# Patient Record
Sex: FEMALE | Race: WHITE | URBAN - METROPOLITAN AREA
[De-identification: names, ages, dates, MRNs, and addresses within clinical notes are randomized per-mention and may not be internally consistent; named-entity substitution may affect disease eponyms.]

---

## 2018-04-27 ENCOUNTER — TRANSFERRED RECORDS (OUTPATIENT)
Dept: HEALTH INFORMATION MANAGEMENT | Facility: CLINIC | Age: 83
End: 2018-04-27

## 2018-04-27 DIAGNOSIS — E11.9 DIABETES MELLITUS (H): ICD-10-CM

## 2018-04-27 DIAGNOSIS — H35.3131 EARLY STAGE NONEXUDATIVE AGE-RELATED MACULAR DEGENERATION OF BOTH EYES: ICD-10-CM

## 2018-04-27 DIAGNOSIS — Z96.1 PSEUDOPHAKIA: ICD-10-CM

## 2018-04-27 DIAGNOSIS — I10 BENIGN ESSENTIAL HYPERTENSION: Primary | ICD-10-CM

## 2018-04-27 DIAGNOSIS — H18.519 ENDOTHELIAL CORNEAL DYSTROPHY: ICD-10-CM

## 2018-04-27 DIAGNOSIS — H34.11 CENTRAL RETINAL ARTERY OCCLUSION OF RIGHT EYE: Primary | ICD-10-CM

## 2018-04-27 DIAGNOSIS — H43.813 POSTERIOR VITREOUS DETACHMENT OF BOTH EYES: ICD-10-CM

## 2018-04-27 LAB
CRP SERPL-MCNC: 5.8 MG/L (ref 0–8)
ERYTHROCYTE [DISTWIDTH] IN BLOOD BY AUTOMATED COUNT: 14 % (ref 10–15)
ERYTHROCYTE [SEDIMENTATION RATE] IN BLOOD BY WESTERGREN METHOD: 7 MM/H (ref 0–30)
FIBRINOGEN PPP-MCNC: 341 MG/DL (ref 200–420)
HCT VFR BLD AUTO: 46.1 % (ref 35–47)
HGB BLD-MCNC: 15.1 G/DL (ref 11.7–15.7)
MCH RBC QN AUTO: 32.4 PG (ref 26.5–33)
MCHC RBC AUTO-ENTMCNC: 32.8 G/DL (ref 31.5–36.5)
MCV RBC AUTO: 99 FL (ref 78–100)
PLATELET # BLD AUTO: 241 10E9/L (ref 150–450)
RBC # BLD AUTO: 4.66 10E12/L (ref 3.8–5.2)
WBC # BLD AUTO: 9.6 10E9/L (ref 4–11)

## 2018-04-27 PROCEDURE — 85027 COMPLETE CBC AUTOMATED: CPT | Performed by: OPHTHALMOLOGY

## 2018-04-27 PROCEDURE — 86140 C-REACTIVE PROTEIN: CPT | Performed by: OPHTHALMOLOGY

## 2018-04-27 PROCEDURE — 36415 COLL VENOUS BLD VENIPUNCTURE: CPT | Performed by: OPHTHALMOLOGY

## 2018-04-27 PROCEDURE — 85652 RBC SED RATE AUTOMATED: CPT | Performed by: OPHTHALMOLOGY

## 2018-04-27 PROCEDURE — 85384 FIBRINOGEN ACTIVITY: CPT | Performed by: OPHTHALMOLOGY

## 2018-04-30 ENCOUNTER — HOSPITAL ENCOUNTER (OUTPATIENT)
Dept: CARDIOLOGY | Facility: CLINIC | Age: 83
Discharge: HOME | End: 2018-04-30
Payer: MEDICARE

## 2018-04-30 ENCOUNTER — HOSPITAL ENCOUNTER (EMERGENCY)
Facility: CLINIC | Age: 83
Discharge: HOME | End: 2018-04-30
Attending: FAMILY MEDICINE | Admitting: FAMILY MEDICINE
Payer: MEDICARE

## 2018-04-30 ENCOUNTER — APPOINTMENT (OUTPATIENT)
Dept: MRI IMAGING | Facility: CLINIC | Age: 83
End: 2018-04-30
Attending: FAMILY MEDICINE
Payer: MEDICARE

## 2018-04-30 ENCOUNTER — HOSPITAL ENCOUNTER (OUTPATIENT)
Dept: ULTRASOUND IMAGING | Facility: CLINIC | Age: 83
End: 2018-04-30
Attending: OPHTHALMOLOGY
Payer: MEDICARE

## 2018-04-30 VITALS
TEMPERATURE: 96.8 F | DIASTOLIC BLOOD PRESSURE: 76 MMHG | OXYGEN SATURATION: 98 % | SYSTOLIC BLOOD PRESSURE: 183 MMHG | BODY MASS INDEX: 24.36 KG/M2 | WEIGHT: 141.9 LBS

## 2018-04-30 DIAGNOSIS — M31.6 TEMPORAL ARTERITIS (H): ICD-10-CM

## 2018-04-30 DIAGNOSIS — I65.22 LEFT CAROTID ARTERY STENOSIS: ICD-10-CM

## 2018-04-30 DIAGNOSIS — I10 BENIGN ESSENTIAL HYPERTENSION: ICD-10-CM

## 2018-04-30 LAB
CREAT BLD-MCNC: 0.8 MG/DL (ref 0.52–1.04)
GFR SERPL CREATININE-BSD FRML MDRD: 69 ML/MIN/1.7M2

## 2018-04-30 PROCEDURE — 99285 EMERGENCY DEPT VISIT HI MDM: CPT | Mod: 25 | Performed by: FAMILY MEDICINE

## 2018-04-30 PROCEDURE — 93306 TTE W/DOPPLER COMPLETE: CPT | Mod: 26 | Performed by: INTERNAL MEDICINE

## 2018-04-30 PROCEDURE — 70549 MR ANGIOGRAPH NECK W/O&W/DYE: CPT

## 2018-04-30 PROCEDURE — 25000125 ZZHC RX 250: Performed by: FAMILY MEDICINE

## 2018-04-30 PROCEDURE — 70544 MR ANGIOGRAPHY HEAD W/O DYE: CPT

## 2018-04-30 PROCEDURE — 99285 EMERGENCY DEPT VISIT HI MDM: CPT | Mod: Z6 | Performed by: FAMILY MEDICINE

## 2018-04-30 PROCEDURE — A9585 GADOBUTROL INJECTION: HCPCS | Performed by: FAMILY MEDICINE

## 2018-04-30 PROCEDURE — 25000128 H RX IP 250 OP 636: Performed by: FAMILY MEDICINE

## 2018-04-30 PROCEDURE — 82565 ASSAY OF CREATININE: CPT

## 2018-04-30 PROCEDURE — 93880 EXTRACRANIAL BILAT STUDY: CPT

## 2018-04-30 PROCEDURE — 93306 TTE W/DOPPLER COMPLETE: CPT

## 2018-04-30 RX ORDER — LEVOTHYROXINE SODIUM 112 UG/1
112 TABLET ORAL DAILY
COMMUNITY
Start: 2018-04-29

## 2018-04-30 RX ORDER — GADOBUTROL 604.72 MG/ML
7.5 INJECTION INTRAVENOUS ONCE
Status: COMPLETED | OUTPATIENT
Start: 2018-04-30 | End: 2018-04-30

## 2018-04-30 RX ADMIN — GADOBUTROL 7.5 ML: 604.72 INJECTION INTRAVENOUS at 16:17

## 2018-04-30 RX ADMIN — SODIUM CHLORIDE 50 ML: 9 INJECTION, SOLUTION INTRAVENOUS at 16:17

## 2018-04-30 ASSESSMENT — ENCOUNTER SYMPTOMS
ACTIVITY CHANGE: 0
WEAKNESS: 0

## 2018-04-30 NOTE — ED AVS SNAPSHOT
[facility] Emergency Department    [address] [place] [address]    Phone:  [phone]    Fax:  [phone]                                       Bertha Ornelas   MRN: [id number]    Department:  [facility] Emergency Department   Date of Visit:  4/30/2018           Patient Information     Date Of Birth          1935        Your diagnoses for this visit were:     Left carotid artery stenosis        You were seen by Tracey Land MD.      Follow-up Information     Schedule an appointment as soon as possible for a visit with Marco A Lara MD.    Specialty:  Family Practice    Why:  as soon as possible    Contact information:    [facility]  [address] [place]  [place] [address]  [phone]          Follow up with [facility] Emergency Department.    Specialty:  EMERGENCY MEDICINE    Why:  If symptoms worsen    Contact information:    [address] [place] [address] [address] [place] [address] [phone]    Additional information:    From [address]: Exit at [employer] on south side of [place]. Turn right on [address]. Turn left at stoplight on [address]. [facility] will be in view two blocks ahead        Discharge Instructions       Thank you for giving us the opportunity to see you.  We completed an MRI scan of your brain and carotid arteries.  There is an isolated severe stenosis of the left carotid bulb.    Please follow-up with your primary care provider as soon as possible.  You should see a vascular surgeon within the next several weeks.    Begin aspirin daily.    After discharge, please closely monitor for any new or worsening symptoms. Return to the Emergency Department at any time if your symptoms worsen.        Carotid Artery Problems: Blockage     A healthy carotid artery lets blood flow easily to the brain.   The blood carries oxygen and nutrients throughout the body. The carotid arteries are large blood vessels that  carry blood to the brain. Certain health problems can make the inside of the carotid arteries narrow and rough. Over time, this damage increases the chances of having a stroke. A stroke is a sudden loss of brain function.   Open carotid arteries  In a healthy carotid artery, the inside of the artery is open. The lining of the artery wall is also smooth. This lets blood flow freely from the heart to the brain. The brain gets all the oxygen and nutrients it needs to function well.  Narrowed carotid arteries  Health factors, such as high blood pressure, high cholesterol, smoking, and diabetes, can damage artery walls and make them rough. This allows cholesterol and other particles in the blood to stick to the artery walls and form plaque or fatty deposits. As the plaque builds up, it can narrow the artery. Blood may also collect on the plaque and form blood clots.  How a stroke happens     Emboli can enter the bloodstream and travel to the brain. Brain tissue is damaged when emboli block arteries in the brain.   A stroke can happen when plaque in the carotid artery ruptures. This can allow small pieces of plaque to break off into the bloodstream. At the same time, rupture can make more blood clots. The pieces of plaque and tiny blood clots or emboli can flow in the blood until they get stuck in a small blood vessel in the brain. This blocks blood flow to part of the brain and causes a stroke.  Date Last Reviewed: 6/8/2015 2000-2017 [employer]. [address] [place] [address]. All rights reserved. This information is not intended as a substitute for professional medical care. Always follow your healthcare professional's instructions.          24 Hour Appointment Hotline       To make an appointment at any [facility], call [phone] ([phone]). If you don't have a family doctor or clinic, we will help you find one. [facility] are conveniently located to serve the needs of  "you and your family.             Review of your medicines      Our records show that you are taking the medicines listed below. If these are incorrect, please call your family doctor or clinic.        Dose / Directions Last dose taken    levothyroxine 112 MCG tablet   Commonly known as:  SYNTHROID/LEVOTHROID   Dose:  112 mcg        Take 112 mcg by mouth daily   Refills:  0                Procedures and tests performed during your visit     Creatinine POCT    Head MRA w/o contrast - STROKE PROTOCOL    Neck MRA w & w/o contrast - STROKE PROTOCOL      Orders Needing Specimen Collection     None      Pending Results     Date and Time Order Name Status Description    4/30/2018 1227 Head MRA w/o contrast - STROKE PROTOCOL Preliminary     4/30/2018 1227 Neck MRA w & w/o contrast - STROKE PROTOCOL Preliminary             Pending Culture Results     No orders found from 4/28/2018 to 5/1/2018.            Pending Results Instructions     If you had any lab results that were not finalized at the time of your Discharge, you can call the ED Lab Result RN at [phone]. You will be contacted by this team for any positive Lab results or changes in treatment. The nurses are available 7 days a week from 10A to 6:30P.  You can leave a message 24 hours per day and they will return your call.        Thank you for choosing [facility]       Thank you for choosing [facility] for your care. Our goal is always to provide you with excellent care. Hearing back from our patients is one way we can continue to improve our services. Please take a few minutes to complete the written survey that you may receive in the mail after you visit with us. Thank you!        [email or web address] Information     trav[employer]l lets you send messages to your doctor, view your test results, renew your prescriptions, schedule appointments and more. To sign up, go to [email or web address] . Click on \"Log in\" on the left side of the screen, which will take you to the Welcome page. " "Then click on \"Sign up Now\" on the right side of the page.     You will be asked to enter the access code listed below, as well as some personal information. Please follow the directions to create your username and password.     Your access code is: [email or web address]  Expires: 2018  5:19 PM     Your access code will  in 90 days. If you need help or a new code, please call your [place] clinic or [phone].        Care EveryWhere ID     This is your Care EveryWhere ID. This could be used by other organizations to access your [place] medical records  [id number]        Equal Access to Services     MARILYN [facility]ONATHON : Therese Escudero, jennifer gardner, mrako choudhury, safia francisco. So [facility] [phone].    ATENCIÓN: Si habla español, tiene a small disposición servicios gratuitos de asistencia lingüística. Llame al [phone].    We comply with applicable federal civil rights laws and [place] laws. We do not discriminate on the basis of race, color, national origin, age, disability, sex, sexual orientation, or gender identity.            After Visit Summary       This is your record. Keep this with you and show to your community pharmacist(s) and doctor(s) at your next visit.                  "

## 2018-04-30 NOTE — ED PROVIDER NOTES
History     Chief Complaint   Patient presents with     Coronary Artery Disease     The history is provided by the patient. No  was used.     Bertha Ornelas is a 82 year old female who presents to the ED after carotid US today revealed >70% stenosis of the left internal carotid artery. She was receiving this imaging for recent deficits of her vision in the right eye. Her visual symptoms started on Thursday and were characterized by blurry or hazy vision in the left visual field of her right eye. She can still see objects in this portion of her visual field but the objects are out of focus. The right visual field of the right eye is normal. She was seen for these symptoms by an ophthalmologist in [place] who referred her to Dr. Yepez, a vitreoretinal surgeon in [place]. 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 Dr. Yepez advised optimization of her CV risk factors, and a carotid US and echocardiogram. Echocardiogram was normal.       The patient denies any other symptoms, denying weakness and has not noticed any other changes recently. She has headaches but nothing new or more severe, she denies temporal tenderness. She does have some jaw tenderness but she thinks this is from being tense throughout her testing and evaluation for her current eye symptoms. Per chart review she has a history of hyperlipidemia not on meds and does smoke 5 cigarettes daily for 8 years. She smoked in her youth as well but had quit smoking for 20 years before starting again 8 years ago.    Problem List:    There are no active problems to display for this patient.       Past Medical History:    Past Medical History:   Diagnosis Date     Other and unspecified hyperlipidemia      Unspecified essential hypertension        Past Surgical History:    Past Surgical History:   Procedure Laterality Date     LASER YAG CAPSULOTOMY  9/20/2012     Procedure: LASER YAG CAPSULOTOMY;  Yag Capsulotomy left eye;  Surgeon: Netta Mcdonald MD;  Location: [facility] OR     PHACOEMULSIFICATION CLEAR CORNEA WITH STANDARD INTRAOCULAR LENS IMPLANT  2012    Procedure:PHACOEMULSIFICATION CLEAR CORNEA WITH STANDARD INTRAOCULAR LENS IMPLANT; PHACOEMULSIFICATION CLEAR CORNEA WITH STANDARD INTRAOCULAR LENS IMPLANT LEFT EYE; Surgeon:NETTA MCDONALD; Location:[facility] OR     PHACOEMULSIFICATION WITH STANDARD INTRAOCULAR LENS IMPLANT  3/15/2012    Procedure:PHACOEMULSIFICATION WITH STANDARD INTRAOCULAR LENS IMPLANT; PHACOEMULSIFICATION WITH STANDARD INTRAOCULAR LENS IMPLANT , RIGHT EYE; Surgeon:NETTA MCDONALD; Location:[facility] OR       Family History:    Family History   Problem Relation Age of Onset     CANCER Father      throat     HEART DISEASE Brother       at age 40 of MI     Lipids Brother        Social History:  Marital Status:   [5]  Social History   Substance Use Topics     Smoking status: Current Every Day Smoker     Packs/day: 0.10     Smokeless tobacco: Never Used     Alcohol use Yes      Comment: 1 glass of wine daily        Medications:      levothyroxine (SYNTHROID/LEVOTHROID) 112 MCG tablet         Review of Systems   Constitutional: Negative for activity change.   Neurological: Negative for weakness.       Physical Exam   BP: (!) 196/91  Heart Rate: 77  Temp: 96.8  F (36  C)  Weight: 64.4 kg (141 lb 14.4 oz)  SpO2: 98 %      Physical Exam   Constitutional: She is oriented to person, place, and time. She appears well-developed and well-nourished. No distress.   HENT:   Head: Normocephalic.   Eyes: Conjunctivae and EOM are normal. Pupils are equal, round, and reactive to light. Right eye exhibits no discharge. Left eye exhibits no discharge.   Deficit in the left visual field of right eye. All other visual fields intact bilaterally    Cardiovascular: Normal rate, regular rhythm, normal heart sounds and intact distal pulses.  Exam reveals no gallop and no friction  rub.    No murmur heard.  Pulmonary/Chest: Effort normal and breath sounds normal. No respiratory distress. She has no wheezes. She has no rales. She exhibits no tenderness.   Abdominal: Soft. She exhibits no distension. There is no tenderness.   Musculoskeletal: She exhibits no edema or tenderness.   No temporal tenderness   Neurological: She is alert and oriented to person, place, and time. No cranial nerve deficit. She exhibits normal muscle tone. Coordination normal.   Strength 5+ and symmetric in upper extremities   Skin: Skin is warm and dry. She is not diaphoretic.   Psychiatric: She has a normal mood and affect. Her behavior is normal. Judgment and thought content normal.       ED Course     ED Course     Procedures               Critical Care time:  none             Results for orders placed or performed during the hospital encounter of 04/30/18 (from the past 24 hour(s))   Creatinine POCT   Result Value Ref Range    Creatinine 0.8 0.52 - 1.04 mg/dL    GFR Estimate 69 >60 mL/min/1.7m2    GFR Estimate If Black 83 >60 mL/min/1.7m2   Head MRA w/o contrast - STROKE PROTOCOL    Narrative    MR ANGIOGRAM OF THE HEAD WITHOUT CONTRAST   4/30/2018 4:34 PM     COMPARISON: None    HISTORY: Right eye hemianopsia.    TECHNIQUE: 3D time-of-flight MR angiogram of the head without  contrast. MIP reconstruction of all MR angiographic data was  performed.    FINDINGS: The bilateral distal internal carotid, basilar, bilateral  anterior cerebral, bilateral middle cerebral and bilateral posterior  cerebral arteries are patent and unremarkable with no evidence for  cerebral artery stenosis or aneurysm. The anterior communicating  artery is patent and unremarkable. The posterior communicating  arteries bilaterally are patent and unremarkable.       Impression    IMPRESSION: Normal MR angiogram of the head.       Neck MRA w & w/o contrast - STROKE PROTOCOL    Narrative    MRA NECK WITHOUT AND WITH CONTRAST  4/30/2018 4:51 PM      COMPARISON: Carotid Doppler ultrasound 4/30/2018.    HISTORY: Critical left carotid stenosis. Right eye hemianopsia,  critical left carotid stenosis.     TECHNIQUE: 2D time-of-flight MR angiogram of the neck without contrast  and 3D MR angiogram of the neck with 7.5 mL Gadavist gadolinium IV  contrast.  MIP reconstruction of all MR angiographic data was  performed. Estimates of carotid stenoses are made relative to the  distal internal carotid artery diameters except as noted.    FINDINGS:    Carotids: The common carotid arteries bilaterally are widely patent.  Severe stenosis in the left carotid bulb again noted. The cervical  internal carotid arteries bilaterally are otherwise patent without  stenosis.    Vertebrals: The vertebral arteries bilaterally are patent without  stenosis and demonstrate antegrade flow.    Aortic arch: The arteries as they arise from the aortic arch are  normally arranged with no evidence for stenosis.      Impression    IMPRESSION:  1. Severe stenosis in the left carotid bulb again noted.  2. Otherwise, normal neck MRA.       Medications   gadobutrol (GADAVIST) injection 7.5 mL (7.5 mLs Intravenous Given 4/30/18 1617)   sodium chloride 0.9 % bag 500mL for CT scan flush use (50 mLs Intravenous Given 4/30/18 1617)       Assessments & Plan (with Medical Decision Making)  Patient is status post right central retinal artery occlusion with workup that revealed a >70% stenosis in the left internal carotid artery on US. She has disturbance of the left visual field of her right eye but no other symptoms of stroke. Chart review shows a history of hyperlipidemia not on medications and she is a current everyday smoker. Given her risks for stroke, a MRA of her head and neck was ordered and revealed severe stenosis in the left carotid bulb.  Patient was encouraged to begin 2 baby aspirins daily.  She should follow up with her primary care provider and be referred to a vascular surgeon for  consultation as soon as possible.  She was instructed to return immediately if she develops any new stroke symptoms.       I have reviewed the nursing notes.    I have reviewed the findings, diagnosis, plan and need for follow up with the patient.       Discharge Medication List as of 4/30/2018  5:19 PM          Final diagnoses:   Left carotid artery stenosis     This document serves as a record of the services and decisions personally performed by Tracey Land MD. It was created on his behalf by Rekha Vallejo, a trained medical student. The creation of this record is based on the provider's observations and the statements of the patient.      Rekha Vallejo, MS4  April 30, 2018 4/30/2018   [facility] EMERGENCY DEPARTMENT     Tracey Land MD  04/30/18 1940

## 2018-04-30 NOTE — ED AVS SNAPSHOT
[facility] Emergency Department    [address] [place] [address]    Phone:  [phone]    Fax:  [phone]                                       Bertha Ornelas   MRN: [id number]    Department:  [facility] Emergency Department   Date of Visit:  4/30/2018           After Visit Summary Signature Page     I have received my discharge instructions, and my questions have been answered. I have discussed any challenges I see with this plan with the nurse or doctor.    ..........................................................................................................................................  Patient/Patient Representative Signature      ..........................................................................................................................................  Patient Representative Print Name and Relationship to Patient    ..................................................               ................................................  Date                                            Time    ..........................................................................................................................................  Reviewed by Signature/Title    ...................................................              ..............................................  Date                                                            Time

## 2018-04-30 NOTE — DISCHARGE INSTRUCTIONS
Thank you for giving us the opportunity to see you.  We completed an MRI scan of your brain and carotid arteries.  There is an isolated severe stenosis of the left carotid bulb.    Please follow-up with your primary care provider as soon as possible.  You should see a vascular surgeon within the next several weeks.    Begin aspirin daily.    After discharge, please closely monitor for any new or worsening symptoms. Return to the Emergency Department at any time if your symptoms worsen.        Carotid Artery Problems: Blockage     A healthy carotid artery lets blood flow easily to the brain.   The blood carries oxygen and nutrients throughout the body. The carotid arteries are large blood vessels that carry blood to the brain. Certain health problems can make the inside of the carotid arteries narrow and rough. Over time, this damage increases the chances of having a stroke. A stroke is a sudden loss of brain function.   Open carotid arteries  In a healthy carotid artery, the inside of the artery is open. The lining of the artery wall is also smooth. This lets blood flow freely from the heart to the brain. The brain gets all the oxygen and nutrients it needs to function well.  Narrowed carotid arteries  Health factors, such as high blood pressure, high cholesterol, smoking, and diabetes, can damage artery walls and make them rough. This allows cholesterol and other particles in the blood to stick to the artery walls and form plaque or fatty deposits. As the plaque builds up, it can narrow the artery. Blood may also collect on the plaque and form blood clots.  How a stroke happens     Emboli can enter the bloodstream and travel to the brain. Brain tissue is damaged when emboli block arteries in the brain.   A stroke can happen when plaque in the carotid artery ruptures. This can allow small pieces of plaque to break off into the bloodstream. At the same time, rupture can make more blood clots. The pieces of plaque and  tiny blood clots or emboli can flow in the blood until they get stuck in a small blood vessel in the brain. This blocks blood flow to part of the brain and causes a stroke.  Date Last Reviewed: 6/8/2015 2000-2017 [employer]. [address] [place] [address], [place] [address]. All rights reserved. This information is not intended as a substitute for professional medical care. Always follow your healthcare professional's instructions.

## 2019-02-21 ENCOUNTER — OFFICE VISIT (OUTPATIENT)
Dept: OPHTHALMOLOGY | Facility: CLINIC | Age: 84
End: 2019-02-21
Payer: MEDICARE

## 2019-02-21 DIAGNOSIS — H43.813 POSTERIOR VITREOUS DETACHMENT OF BOTH EYES: ICD-10-CM

## 2019-02-21 DIAGNOSIS — H50.21 HYPERTROPIA OF RIGHT EYE: ICD-10-CM

## 2019-02-21 DIAGNOSIS — H52.4 PRESBYOPIA: ICD-10-CM

## 2019-02-21 DIAGNOSIS — H50.00 ESODEVIATION: ICD-10-CM

## 2019-02-21 DIAGNOSIS — Z96.1 PSEUDOPHAKIA OF BOTH EYES: Primary | ICD-10-CM

## 2019-02-21 PROCEDURE — 92004 COMPRE OPH EXAM NEW PT 1/>: CPT | Performed by: OPHTHALMOLOGY

## 2019-02-21 PROCEDURE — 92015 DETERMINE REFRACTIVE STATE: CPT | Mod: GY | Performed by: OPHTHALMOLOGY

## 2019-02-21 RX ORDER — FLUOXETINE 40 MG/1
CAPSULE ORAL
Refills: 1 | COMMUNITY
Start: 2018-02-23

## 2019-02-21 RX ORDER — ATORVASTATIN CALCIUM 20 MG/1
20 TABLET, FILM COATED ORAL
COMMUNITY
Start: 2018-11-09

## 2019-02-21 RX ORDER — MECLIZINE HCL 12.5 MG 12.5 MG/1
12.5 TABLET ORAL
COMMUNITY
Start: 2019-01-15

## 2019-02-21 RX ORDER — BUSPIRONE HYDROCHLORIDE 10 MG/1
10 TABLET ORAL 2 TIMES DAILY
Refills: 1 | COMMUNITY
Start: 2018-02-23

## 2019-02-21 RX ORDER — ASPIRIN 81 MG/1
81 TABLET ORAL
COMMUNITY
Start: 2018-05-02

## 2019-02-21 RX ORDER — CALCIUM CARBONATE 500 MG/1
500 TABLET, CHEWABLE ORAL
COMMUNITY
Start: 2016-03-22

## 2019-02-21 RX ORDER — ATORVASTATIN CALCIUM 40 MG/1
TABLET, FILM COATED ORAL
Refills: 1 | COMMUNITY
Start: 2018-10-26

## 2019-02-21 ASSESSMENT — REFRACTION_WEARINGRX
OD_CYLINDER: +1.00
OS_SPHERE: -2.25
OD_ADD: +3.25
OS_CYLINDER: +1.00
OD_SPHERE: -1.25
OD_AXIS: 100
OS_ADD: +3.25
OS_AXIS: 100
SPECS_TYPE: BIFOCAL

## 2019-02-21 ASSESSMENT — TONOMETRY
IOP_METHOD: APPLANATION
OD_IOP_MMHG: 16
OS_IOP_MMHG: 15

## 2019-02-21 ASSESSMENT — REFRACTION_FINALRX
OS_HPRISM: 3.0
OD_HPRISM: 3.0
OS_VPRISM: 1.0
OS_HBASE: OUT
OD_HBASE: OUT

## 2019-02-21 ASSESSMENT — REFRACTION_MANIFEST
OS_CYLINDER: +0.50
OS_ADD: +3.00
OD_SPHERE: -0.25
OD_CYLINDER: SPHERE
OS_AXIS: 120
OD_ADD: +3.00
OS_SPHERE: -1.00

## 2019-02-21 ASSESSMENT — VISUAL ACUITY
OD_CC: J3
METHOD: SNELLEN - LINEAR
OS_SC: 20/40
OD_CC: 20/70
OS_CC: 20/70
OS_CC: J3
OD_SC: 20/25

## 2019-02-21 ASSESSMENT — EXTERNAL EXAM - LEFT EYE: OS_EXAM: NORMAL

## 2019-02-21 ASSESSMENT — CONF VISUAL FIELD
OD_NORMAL: 1
OS_NORMAL: 1

## 2019-02-21 ASSESSMENT — CUP TO DISC RATIO
OD_RATIO: 0.2
OS_RATIO: 0.3

## 2019-02-21 ASSESSMENT — EXTERNAL EXAM - RIGHT EYE: OD_EXAM: NORMAL

## 2019-02-21 NOTE — LETTER
"    2/21/2019         RE: Bertha Ornelas  [address]  [place] [address]        Dear Colleague,    Thank you for referring your patient, Bertha Ornelas, to the [facility]. Please see a copy of my visit note below.     Current Eye Medications:  None.     Subjective:  Comprehensive eye exam.   Pt reports that TV print looks double( pt describes more like words overlap) and when driving at night headlights seem double.Pt has bifocals  that are not right for her that she does not use, uses otc readers only  Pt states had vascular  occlusion right eye about a year ago and vision seems \"shadowy in this eye. Pt has cataract surgery both eyes some years ago.    Apparently longstanding right head tilt.      Objective:  See Ophthalmology Exam.       Assessment:  Baseline eye exam in patient with quiet pseudophakia both eyes and apparent symptomatic esodeviation with right hypertropia.      ICD-10-CM    1. Pseudophakia, ou; Yag Caps, os Z96.1 EYE EXAM (SIMPLE-NONBILLABLE)   2. Esodeviation H50.00    3. 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 Posterior vitreous detachment of both eyes H43.813    5. Presbyopia H52.4 REFRACTIVE STATUS        Plan:  Glasses Rx given - optional.  Try prism correction.  Ok to continue using over the counter readers as needed for close up vision.  Use artificial tears up to 4 times daily both eyes.  (Refresh Tears, Systane Ultra/Balance, or Theratears)  Possible clouding of posterior capsule right eye discussed.  Return visit 2 months for a [facility] and MD check.  True Del Rosario M.D.  [phone]        Again, thank you for allowing me to participate in the care of your patient.        Sincerely,        True Del Rosario MD    "

## 2019-02-21 NOTE — PATIENT INSTRUCTIONS
Glasses Rx given - optional.  Ok to continue using over the counter readers as needed for close up vision.  Use artificial tears up to 4 times daily both eyes.  (Refresh Tears, Systane Ultra/Balance, or Theratears)  Possible clouding of posterior capsule right eye discussed.  Return visit 2 months for a [facility] Visual Field and MD check.  True Del Rosario M.D.  [phone]

## 2019-02-21 NOTE — PROGRESS NOTES
" Current Eye Medications:  None.     Subjective:  Comprehensive eye exam.   Pt reports that TV print looks double( pt describes more like words overlap) and when driving at night headlights seem double.Pt has bifocals  that are not right for her that she does not use, uses otc readers only  Pt states had vascular  occlusion right eye about a year ago and vision seems \"shadowy in this eye. Pt has cataract surgery both eyes some years ago.    Apparently longstanding right head tilt.      Objective:  See Ophthalmology Exam.       Assessment:  Baseline eye exam in patient with quiet pseudophakia both eyes and apparent symptomatic esodeviation with right hypertropia.      ICD-10-CM    1. Pseudophakia, ou; Yag Caps, os Z96.1 EYE EXAM (SIMPLE-NONBILLABLE)   2. Esodeviation H50.00    3. 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 Posterior vitreous detachment of both eyes H43.813    5. Presbyopia H52.4 REFRACTIVE STATUS        Plan:  Glasses Rx given - optional.  Try prism correction.  Ok to continue using over the counter readers as needed for close up vision.  Use artificial tears up to 4 times daily both eyes.  (Refresh Tears, Systane Ultra/Balance, or Theratears)  Possible clouding of posterior capsule right eye discussed.  Return visit 2 months for a [facility] Visual Field and MD check.  True Del Rosario M.D.  [phone]      "

## 2019-02-24 PROBLEM — Z96.1 PSEUDOPHAKIA OF BOTH EYES: Status: ACTIVE | Noted: 2019-02-24

## 2019-02-24 PROBLEM — H43.813 POSTERIOR VITREOUS DETACHMENT OF BOTH EYES: Status: ACTIVE | Noted: 2019-02-24
